# Patient Record
Sex: FEMALE | URBAN - NONMETROPOLITAN AREA
[De-identification: names, ages, dates, MRNs, and addresses within clinical notes are randomized per-mention and may not be internally consistent; named-entity substitution may affect disease eponyms.]

---

## 2018-05-16 ENCOUNTER — TELEPHONE (OUTPATIENT)
Dept: URGENT CARE | Facility: PHYSICIAN GROUP | Age: 58
End: 2018-05-16

## 2018-05-16 DIAGNOSIS — I10 ESSENTIAL HYPERTENSION: ICD-10-CM

## 2018-05-16 NOTE — TELEPHONE ENCOUNTER
1. Caller Name: Pt. Came to clinic                                         Call Back Number: N/A      Patient approves a detailed voicemail message: N\A    2. What are the patient's symptoms (location & severity)? Pt. Came in to see UC at 7:55 AM. Informed her UC does not open until 9:00AM and asked her what her Symptoms are. She stated extremely high BP. At this time I asked her if she would like me to call her an ambulance. She refused ambulance to go across the street to [place]. Informed her I will need her to sign an AMA/Refusal of Treatment because she is leaving facility with out provider seeing and does not want me to call 911. At this time I asked Tess to come up to the front because she kept stating she had to go and I felt she was not going to sign AMA. Tess came to front. I went to finish printing AMA and they said they needed to leave. I said again, so you are refusing me calling you an Ambulance. They stated yes the are refusing and walked out.